# Patient Record
Sex: FEMALE | Race: WHITE
[De-identification: names, ages, dates, MRNs, and addresses within clinical notes are randomized per-mention and may not be internally consistent; named-entity substitution may affect disease eponyms.]

---

## 2017-05-22 ENCOUNTER — HOSPITAL ENCOUNTER (EMERGENCY)
Dept: HOSPITAL 25 - UCEAST | Age: 47
Discharge: HOME | End: 2017-05-22
Payer: MEDICARE

## 2017-05-22 VITALS — SYSTOLIC BLOOD PRESSURE: 114 MMHG | DIASTOLIC BLOOD PRESSURE: 72 MMHG

## 2017-05-22 DIAGNOSIS — M21.70: ICD-10-CM

## 2017-05-22 DIAGNOSIS — S73.004A: Primary | ICD-10-CM

## 2017-05-22 DIAGNOSIS — M24.452: ICD-10-CM

## 2017-05-22 PROCEDURE — G0463 HOSPITAL OUTPT CLINIC VISIT: HCPCS

## 2017-05-22 PROCEDURE — 73523 X-RAY EXAM HIPS BI 5/> VIEWS: CPT

## 2017-05-22 PROCEDURE — 99212 OFFICE O/P EST SF 10 MIN: CPT
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
## 2017-05-22 NOTE — RAD
Indication: Hip dislocation



5 views of left hip demonstrates superiorly subluxed humeral head out of the acetabulum.

Marked deformity and superior dislocation of left femoral head is noted. When compared to

previous exam of 5-12 this is unchanged.



IMPRESSION: Chronic dislocation of the left hip. Right hip also demonstrates superior

dislocation of the femoral head.

## 2018-02-06 ENCOUNTER — HOSPITAL ENCOUNTER (EMERGENCY)
Dept: HOSPITAL 25 - UCEAST | Age: 48
Discharge: HOME | End: 2018-02-06
Payer: MEDICAID

## 2018-02-06 VITALS — SYSTOLIC BLOOD PRESSURE: 106 MMHG | DIASTOLIC BLOOD PRESSURE: 71 MMHG

## 2018-02-06 DIAGNOSIS — Z91.040: ICD-10-CM

## 2018-02-06 DIAGNOSIS — Z88.3: ICD-10-CM

## 2018-02-06 DIAGNOSIS — Z04.1: Primary | ICD-10-CM

## 2018-02-06 DIAGNOSIS — Z91.041: ICD-10-CM

## 2018-02-06 PROCEDURE — 99212 OFFICE O/P EST SF 10 MIN: CPT

## 2018-02-06 PROCEDURE — G0463 HOSPITAL OUTPT CLINIC VISIT: HCPCS
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
## 2020-01-01 ENCOUNTER — HOSPITAL ENCOUNTER (EMERGENCY)
Dept: HOSPITAL 25 - ED | Age: 50
Discharge: HOME | End: 2020-01-01
Payer: MEDICARE

## 2020-01-01 VITALS — DIASTOLIC BLOOD PRESSURE: 90 MMHG | SYSTOLIC BLOOD PRESSURE: 127 MMHG

## 2020-01-01 DIAGNOSIS — R09.89: Primary | ICD-10-CM

## 2020-01-01 DIAGNOSIS — F41.9: ICD-10-CM

## 2020-01-01 PROCEDURE — 99282 EMERGENCY DEPT VISIT SF MDM: CPT

## 2020-01-01 PROCEDURE — 71046 X-RAY EXAM CHEST 2 VIEWS: CPT
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
## 2020-01-10 ENCOUNTER — HOSPITAL ENCOUNTER (EMERGENCY)
Dept: HOSPITAL 25 - UCEAST | Age: 50
Discharge: HOME | End: 2020-01-10
Payer: MEDICARE

## 2020-01-10 VITALS — DIASTOLIC BLOOD PRESSURE: 98 MMHG | SYSTOLIC BLOOD PRESSURE: 158 MMHG

## 2020-01-10 DIAGNOSIS — Z91.041: ICD-10-CM

## 2020-01-10 DIAGNOSIS — J06.9: Primary | ICD-10-CM

## 2020-01-10 DIAGNOSIS — Z91.040: ICD-10-CM

## 2020-01-10 DIAGNOSIS — Z91.09: ICD-10-CM

## 2020-01-10 LAB
FLUAV RNA SPEC QL NAA+PROBE: NEGATIVE
FLUBV RNA SPEC QL NAA+PROBE: NEGATIVE

## 2020-01-10 PROCEDURE — 99212 OFFICE O/P EST SF 10 MIN: CPT

## 2020-01-10 PROCEDURE — G0463 HOSPITAL OUTPT CLINIC VISIT: HCPCS

## 2020-01-10 NOTE — UC
FLU HPI





- HPI Summary


HPI Summary: 


49-year-old female comes in with a chief complaint of cough and fever.  Patient 

has special needs and she lives in a group home.  When she had cough and they 

measured a low-grade fever they want to make sure she did not have the flu.  

Otherwise behaviors been normal.





- History of Current Complaint


Chief Complaint: UCRespiratory


Stated Complaint: FLU LIKE COMPLAINTS


Time Seen by Provider: 01/10/20 18:39


Hx Last Menstrual Period: random


Pain Intensity: 0





- Allergy/Home Medications


Allergies/Adverse Reactions: 


 Allergies











Allergy/AdvReac Type Severity Reaction Status Date / Time


 


Iodinated Contrast Media Allergy Unknown Unknown Verified 01/10/20 18:36





[Iodinated Contrast- Oral   Reaction  





and IV Dye]   Details  


 


iodine Allergy Unknown Unknown Verified 01/10/20 18:36





   Reaction  





   Details  


 


latex Allergy Unknown Unknown Verified 01/10/20 18:36





   Reaction  





   Details  














PMH/Surg Hx/FS Hx/Imm Hx


Previously Healthy: Yes - special needs,lives in group home


Neurological History: Seizures


Other History Of: 


   Negative For: Anticoagulant Therapy





- Surgical History


Surgical History: Yes


Surgery Procedure, Year, and Place: SPINAL SURGERY RODS UNABLE TO SCAN,  SHUNT





- Family History


Known Family History: Positive: Unknown - Pt is a level 5 caveat secondary to 

developmental delay.





- Social History


Alcohol Use: None


Substance Use Type: None


Smoking Status (MU): Never Smoked Tobacco





- Immunization History


Vaccination Up to Date: Yes





Review of Systems


All Other Systems Reviewed And Are Negative: Yes


Constitutional: Positive: Fever - see hpi, Other - see hpi


Skin: Positive: Negative


Eyes: Positive: Negative


ENT: Positive: Other - see hpi


Respiratory: Positive: Cough


Cardiovascular: Positive: Negative


Gastrointestinal: Positive: Negative


Motor: Positive: Negative


Neurovascular: Positive: Negative


Musculoskeletal: Positive: Negative


Neurological: Positive: Negative


Psychological: Positive: Negative


Is Patient Immunocompromised?: No





Physical Exam


Triage Information Reviewed: Yes


Appearance: Well-Appearing, No Pain Distress, Well-Nourished


Vital Signs: 


 Initial Vital Signs











Temp  99.8 F   01/10/20 18:31


 


Pulse  115   01/10/20 18:31


 


Resp  20   01/10/20 18:31


 


BP  158/98   01/10/20 18:31


 


Pulse Ox  98   01/10/20 18:31











Vital Signs Reviewed: Yes


Eye Exam: Normal


Eyes: Positive: Conjunctiva Clear


Neck: Positive: Supple


Respiratory: Positive: Lungs clear, Normal breath sounds, No respiratory 

distress


Cardiovascular: Positive: RRR


Musculoskeletal: Positive: Other: - Chronic muscle palsy


Neurological: Positive: Alert


Psychological: Positive: Normal Response To Family - Normal response to 

caregiver


Skin Exam: Normal





Flu Course/Dx





- Course


Course Of Treatment: 


Influenza swab was negative.  No fever measured here in clinic.  Clinically the 

patient appears well.  At this time we'll treat for potential viral upper 

respiratory tract infection and the patient reevaluated if any worsening or any 

other concerns.





- Differential Dx/Diagnosis


Provider Diagnosis: 


 Upper respiratory infection








Discharge ED





- Sign-Out/Discharge


Documenting (check all that apply): Patient Departure


All imaging exams completed and their final reports reviewed: No Studies





- Discharge Plan


Condition: Stable


Disposition: HOME


Patient Education Materials:  Upper Respiratory Infection (ED)


Referrals: 


Vee Sparks MD [Primary Care Provider] - 


Additional Instructions: 


FOLLOW UP WITH YOUR DOCTOR IF NOT COMPLETELY IMPROVED.


GET REEVALUATED SOONER IF NOT IMPROVED OR WORSE OR ANY QUESTIONS OR CONCERNS.





- Billing Disposition and Condition


Condition: STABLE


Disposition: Home

## 2020-02-21 ENCOUNTER — HOSPITAL ENCOUNTER (EMERGENCY)
Dept: HOSPITAL 25 - UCEAST | Age: 50
Discharge: HOME | End: 2020-02-21
Payer: MEDICARE

## 2020-02-21 VITALS — SYSTOLIC BLOOD PRESSURE: 100 MMHG | DIASTOLIC BLOOD PRESSURE: 66 MMHG

## 2020-02-21 DIAGNOSIS — Z91.041: ICD-10-CM

## 2020-02-21 DIAGNOSIS — R19.7: ICD-10-CM

## 2020-02-21 DIAGNOSIS — Z91.040: ICD-10-CM

## 2020-02-21 DIAGNOSIS — R09.81: ICD-10-CM

## 2020-02-21 DIAGNOSIS — R09.89: Primary | ICD-10-CM

## 2020-02-21 DIAGNOSIS — Z91.09: ICD-10-CM

## 2020-02-21 PROCEDURE — 99212 OFFICE O/P EST SF 10 MIN: CPT

## 2020-02-21 PROCEDURE — G0463 HOSPITAL OUTPT CLINIC VISIT: HCPCS

## 2020-02-21 NOTE — UC
FLU HPI





- HPI Summary


HPI Summary: 


Patient is a 50yo female group home resident presenting with caretaker for "flu 

symptoms." History obtained from caretaker, as patient is nonverbal. Caretaker, 

Haylee, states the patient has had a "runny nose and low grade fever of 99 

something" since yesterday. Denies cough, sob, wheezing, and difficulty 

breathing. Denies n/v. Does note a couple episodes of diarrhea. Notes normal 

appetite and fluid intake. States "another family that visited the group home 

had a member who had tested positive for flu and now residents in the home are 

coming down with symptoms."








- History of Current Complaint


Stated Complaint: WANTS FLU SWAB


Hx Obtained From: Family/Caretaker


Hx Last Menstrual Period: random


Pain Intensity: 0





- Allergy/Home Medications


Allergies/Adverse Reactions: 


 Allergies











Allergy/AdvReac Type Severity Reaction Status Date / Time


 


Iodinated Contrast Media Allergy Unknown Unknown Verified 02/21/20 10:37





[Iodinated Contrast- Oral   Reaction  





and IV Dye]   Details  


 


iodine Allergy Unknown Unknown Verified 02/21/20 10:37





   Reaction  





   Details  


 


latex Allergy Unknown Unknown Verified 02/21/20 10:37





   Reaction  





   Details  











Home Medications: 


 Home Medications





Cetirizine HCl 10 mg PO DAILY 10/06/12 [History Confirmed 02/21/20]


Colace Cap* 100 mg PO DAILY 10/06/12 [History Confirmed 02/21/20]


Magnesium 250 mg PO BID 10/06/12 [History Confirmed 02/21/20]


Multivitamins/Minerals TAB* [Theragran/minerals TAB*] 1 tab PO DAILY 10/06/12 [

History Confirmed 02/21/20]


Vitamin A & D Oint* 1 applic TOPICAL TID PRN 10/06/12 [History Confirmed 10/06/

12]


Vitamin D3 1,000 units PO DAILY 10/06/12 [History Confirmed 02/21/20]


busPIRone TAB* 20 mg PO BID 10/06/12 [History Confirmed 02/21/20]


carBAMazepine TAB(*) 200 mg PO BID 10/06/12 [History Confirmed 02/21/20]


ARIPiprazole TAB* [Abilify  TAB*] 2 mg PO DAILY 02/21/16 [History Confirmed 02/ 21/20]


Diazepam TAB(*) [Valium TAB(*)] 5 mg PO SEE INSTRUCTIONS PRN 05/22/17 [History 

Confirmed 02/21/20]


ARIPiprazole TAB* [Abilify  TAB*] 5 mg PO BEDTIME 02/06/18 [History Confirmed 02 /21/20]


Acetaminophen TAB* [Tylenol TAB*] 650 mg PO Q4H PRN 02/06/18 [History Confirmed 

02/21/20]


Amoxicillin PO (*) [Amoxicillin 500 MG CAP*] 4 cap PO SEE INSTRUCTIONS PRN 02/06 /18 [History]


Bacitracin Zinc 1 each TOPICAL DAILY PRN 02/06/18 [History Confirmed 02/21/20]


Bisacodyl [Dulcolax] 10 mg FL DAILY PRN 02/06/18 [History Confirmed 02/21/20]


Guaifenesin/Dextromethorphan [Robitussin Cough-Chest Dm Liq] 10 ml PO Q4H PRN 02 /06/18 [History Confirmed 02/21/20]


Ibuprofen TAB* [Motrin TAB* 600 MG] 600 mg PO Q6H PRN 02/06/18 [History 

Confirmed 02/21/20]


Mupirocin 2% OINT* [Bactroban 2 % Oint*] 1 applic TOPICAL BID 02/06/18 [History 

Confirmed 02/21/20]


Oxymetazoline 0.05% NASAL SPR* [Afrin 0.05% NASAL SPRAY*] 2 spray BOTH NARES 

BID PRN 02/06/18 [History Confirmed 02/21/20]


Polyethylene Glycol 3350* [Miralax*] 17 gm PO DAILY 02/06/18 [History Confirmed 

02/21/20]


Sodium Phosphate ADULT ENEMA* [Fleet Enema*] 1 bottle .SEE ORDER DAILY PRN 02/06 /18 [History Confirmed 02/21/20]


Wheat Dextrin [Benefiber] 1 each PO DAILY 02/06/18 [History Confirmed 02/21/20]


Whey Protein Isolate [Beneprotein] 1 each PO TID 02/06/18 [History Confirmed 02/ 21/20]











PMH/Surg Hx/FS Hx/Imm Hx


Other History Of: 


   Negative For: Anticoagulant Therapy





- Surgical History


Surgical History: Yes


Surgery Procedure, Year, and Place: SPINAL SURGERY RODS UNABLE TO SCAN,  SHUNT





- Family History


Known Family History: Positive: Unknown - Pt is a level 5 caveat secondary to 

developmental delay.





- Social History


Alcohol Use: None


Substance Use Type: None


Smoking Status (MU): Never Smoked Tobacco





- Immunization History


Vaccination Up to Date: Yes





Review of Systems


All Other Systems Reviewed And Are Negative: Yes


Constitutional: Positive: Fever


ENT: Positive: Nasal Discharge, Sinus Congestion


Respiratory: Positive: Negative


Cardiovascular: Positive: Negative


Gastrointestinal: Positive: Diarrhea





Physical Exam





- Summary


Physical Exam Summary: 


Vital Signs Reviewed: Yes


Alert, no distress


Eyes: Conjunctiva Clear


ENT: Hearing grossly normal, right TM clear, left TM cerumen impaction, moist, 

uvula midline, no exudate, no erythema


Neck: Positive: Supple


Respiratory: Positive: No respiratory distress, No accessory muscle use + CTA 

throughout  no w/r


Cardiovascular: RRR  nl s1, s2  no m/r


Abd: soft + BS nt/nd  no guarding


Musculoskeletal Exam: WHITE x 4 without difficulty, nonambulatory in wheelchair


Neurological: Positive: Alert


Skin: Positive: no rash, no ecchymosis








Vital Signs: 


 Initial Vital Signs











Temp  97.5 F   02/21/20 10:33


 


Pulse  95   02/21/20 10:33


 


Resp  16   02/21/20 10:33


 


BP  100/66   02/21/20 10:33


 


Pulse Ox  100   02/21/20 10:33








 Lab Results











  02/21/20 Range/Units





  10:50 


 


Influenza A (Rapid)  Negative  (Negative)  


 


Influenza B (Rapid)  Negative  (Negative)  














Flu Course/Dx





- Course


Course Of Treatment: 


Negative rapid flu.  Discussed this with caretaker and educated on viral 

illness.  Instructed to continue symptomatic treatment and to follow up with 

PCP if symptoms do not improve within 7 days.  Caretaker voiced understanding 

and agreed with the treatment plan.








- Differential Dx/Diagnosis


Differential Diagnosis/HQI/PQRI: Influenza, Upper Respiratory Infection


Provider Diagnosis: 


 Flu-like symptoms








Discharge ED





- Sign-Out/Discharge


Documenting (check all that apply): Patient Departure


All imaging exams completed and their final reports reviewed: No Studies





- Discharge Plan


Condition: Stable


Disposition: HOME


Patient Education Materials:  Viral Syndrome (ED)


Referrals: 


Vee Sparks MD [Primary Care Provider] - If Needed


Additional Instructions: 


As discussed, you tested negative for influenza today.


You may continue with motrin and/or tylenol for fever and pain relief.


Get plenty of rest and increase your fluid intake.


Follow up with your primary care provider if symptoms do not improve within 7 

days.








- Billing Disposition and Condition


Condition: STABLE


Disposition: Home





- Attestation Statements


Provider Attestation: 





This patient was not seen by me.


I was available for consult.


Chart reviewed.


GIOVANNI